# Patient Record
Sex: FEMALE | Race: WHITE | Employment: FULL TIME | URBAN - METROPOLITAN AREA
[De-identification: names, ages, dates, MRNs, and addresses within clinical notes are randomized per-mention and may not be internally consistent; named-entity substitution may affect disease eponyms.]

---

## 2018-09-25 ENCOUNTER — HOSPITAL ENCOUNTER (EMERGENCY)
Age: 23
Discharge: HOME | End: 2018-09-25
Attending: EMERGENCY MEDICINE
Payer: SELF-PAY

## 2018-09-25 VITALS
SYSTOLIC BLOOD PRESSURE: 128 MMHG | HEART RATE: 96 BPM | OXYGEN SATURATION: 100 % | HEIGHT: 67 IN | WEIGHT: 170 LBS | TEMPERATURE: 98 F | RESPIRATION RATE: 16 BRPM | BODY MASS INDEX: 26.68 KG/M2 | DIASTOLIC BLOOD PRESSURE: 77 MMHG

## 2018-09-25 DIAGNOSIS — T18.0XXA FOREIGN BODY IN MOUTH, INITIAL ENCOUNTER: ICD-10-CM

## 2018-09-25 PROCEDURE — 74011250637 HC RX REV CODE- 250/637: Performed by: PHYSICIAN ASSISTANT

## 2018-09-25 PROCEDURE — 99283 EMERGENCY DEPT VISIT LOW MDM: CPT

## 2018-09-25 PROCEDURE — 74011250636 HC RX REV CODE- 250/636: Performed by: PHYSICIAN ASSISTANT

## 2018-09-25 RX ORDER — FAMOTIDINE 20 MG/1
20 TABLET, FILM COATED ORAL 2 TIMES DAILY
Qty: 14 TAB | Refills: 0 | Status: SHIPPED | OUTPATIENT
Start: 2018-09-25 | End: 2018-09-25

## 2018-09-25 RX ORDER — EPINEPHRINE 0.3 MG/.3ML
0.3 INJECTION SUBCUTANEOUS
Qty: 1 SYRINGE | Refills: 1 | Status: SHIPPED | OUTPATIENT
Start: 2018-09-25 | End: 2018-09-25

## 2018-09-25 RX ORDER — PREDNISONE 20 MG/1
60 TABLET ORAL DAILY
Qty: 12 TAB | Refills: 0 | Status: SHIPPED | OUTPATIENT
Start: 2018-09-25 | End: 2018-09-25

## 2018-09-25 RX ORDER — PREDNISONE 20 MG/1
60 TABLET ORAL DAILY
Qty: 12 TAB | Refills: 0 | Status: SHIPPED | OUTPATIENT
Start: 2018-09-25 | End: 2018-09-29

## 2018-09-25 RX ORDER — FAMOTIDINE 20 MG/1
40 TABLET, FILM COATED ORAL
Status: COMPLETED | OUTPATIENT
Start: 2018-09-25 | End: 2018-09-25

## 2018-09-25 RX ORDER — FAMOTIDINE 20 MG/1
20 TABLET, FILM COATED ORAL 2 TIMES DAILY
Qty: 14 TAB | Refills: 0 | Status: SHIPPED | OUTPATIENT
Start: 2018-09-25 | End: 2018-10-02

## 2018-09-25 RX ORDER — DEXAMETHASONE 4 MG/1
10 TABLET ORAL ONCE
Status: COMPLETED | OUTPATIENT
Start: 2018-09-25 | End: 2018-09-25

## 2018-09-25 RX ORDER — DIPHENHYDRAMINE HCL 25 MG
50 CAPSULE ORAL
Status: COMPLETED | OUTPATIENT
Start: 2018-09-25 | End: 2018-09-25

## 2018-09-25 RX ADMIN — DIPHENHYDRAMINE HYDROCHLORIDE 50 MG: 25 CAPSULE ORAL at 15:07

## 2018-09-25 RX ADMIN — FAMOTIDINE 40 MG: 20 TABLET ORAL at 15:08

## 2018-09-25 RX ADMIN — DEXAMETHASONE 10 MG: 4 TABLET ORAL at 15:07

## 2018-09-25 NOTE — ED TRIAGE NOTES
States she was stung on the roof of her mouth by a bee that was in her drink. States she thinks the stinger is still in her mouth.

## 2018-09-25 NOTE — ED PROVIDER NOTES
EMERGENCY DEPARTMENT HISTORY AND PHYSICAL EXAM 
 
Date: 9/25/2018 Patient Name: Julio Newsome History of Presenting Illness Chief Complaint Patient presents with  
 Other History Provided By: Patient Chief Complaint: mouth pain Duration: 1 Hours Timing:  Acute Severity: 4 out of 10 Associated Symptoms: anxious Additional History (Context):  
2:42 PM 
Julio Newsome is a 23 y.o. female with PMHx of allergy to bees who presents to the emergency department C/O mouth pain onset 1 hour ago. Associated symptoms include anxious. Reports she was stung on the roof of her mouth by a bee that was in her drink. States she feels the stinger in the roof of her mouth. Pt denies throat swelling, trouble swallowing, and any other Sx or complaints. PCP: None Past History Past Medical History: 
Past Medical History:  
Diagnosis Date  Hypoglycemia  Scoliosis Past Surgical History: No past surgical history on file. Family History: No family history on file. Social History: 
Social History Substance Use Topics  Smoking status: Never Smoker  Smokeless tobacco: Never Used  Alcohol use No  
 
 
Allergies: Allergies Allergen Reactions  Cinnamon Anaphylaxis  Bee Sting [Sting, Bee] Swelling Review of Systems Review of Systems Constitutional: Negative for activity change. HENT: Negative for drooling, facial swelling and trouble swallowing.   
     (+) mouth pain (-) throat swelling Respiratory: Negative for cough, shortness of breath, wheezing and stridor. Gastrointestinal: Negative for nausea and vomiting. Musculoskeletal: Negative for joint swelling. Skin: Negative for rash. Allergic/Immunologic: Positive for food allergies. Hematological: Negative for adenopathy. Psychiatric/Behavioral: The patient is nervous/anxious. All other systems reviewed and are negative. Physical Exam  
 
Vitals:  
 09/25/18 1430 BP: 128/77 Pulse: 96  
Resp: 16 Temp: 98 °F (36.7 °C) SpO2: 100% Weight: 77.1 kg (170 lb) Height: 5' 7\" (1.702 m) Physical Exam  
Constitutional: She is oriented to person, place, and time. She appears well-developed and well-nourished. No distress.  female in NAD. Alert. Appears comfortable. Handling secretions. In fast track room. HENT:  
Head: Normocephalic and atraumatic. Right Ear: External ear normal. No swelling or tenderness. Tympanic membrane is not perforated, not erythematous and not bulging. Left Ear: External ear normal. No swelling or tenderness. Tympanic membrane is not perforated, not erythematous and not bulging. Nose: Nose normal. No mucosal edema or rhinorrhea. Right sinus exhibits no maxillary sinus tenderness and no frontal sinus tenderness. Left sinus exhibits no maxillary sinus tenderness and no frontal sinus tenderness. Mouth/Throat: Uvula is midline, oropharynx is clear and moist and mucous membranes are normal. No oral lesions. No trismus in the jaw. No dental abscesses or uvula swelling. No oropharyngeal exudate, posterior oropharyngeal edema, posterior oropharyngeal erythema or tonsillar abscesses. Eyes: Conjunctivae are normal. Right eye exhibits no discharge. Left eye exhibits no discharge. No scleral icterus. Neck: Normal range of motion. Cardiovascular: Normal rate, regular rhythm, normal heart sounds and intact distal pulses. Exam reveals no gallop and no friction rub. No murmur heard. Pulmonary/Chest: Effort normal and breath sounds normal. No accessory muscle usage. No tachypnea. No respiratory distress. She has no decreased breath sounds. She has no wheezes. She has no rhonchi. She has no rales. Musculoskeletal: Normal range of motion. Lymphadenopathy:  
  She has no cervical adenopathy. Neurological: She is alert and oriented to person, place, and time. Skin: Skin is warm and dry. No rash noted. She is not diaphoretic.  No erythema. Psychiatric: She has a normal mood and affect. Judgment normal.  
Nursing note and vitals reviewed. Diagnostic Study Results Labs - No results found for this or any previous visit (from the past 12 hour(s)). Radiologic Studies - No orders to display CT Results  (Last 48 hours) None CXR Results  (Last 48 hours) None Medications given in the ED- Medications diphenhydrAMINE (BENADRYL) capsule 50 mg (50 mg Oral Given 9/25/18 1507) dexamethasone (DECADRON) tablet 10 mg (10 mg Oral Given 9/25/18 1507) famotidine (PEPCID) tablet 40 mg (40 mg Oral Given 9/25/18 1508) Medical Decision Making I am the first provider for this patient. I reviewed the vital signs, available nursing notes, past medical history, past surgical history, family history and social history. Vital Signs-Reviewed the patient's vital signs. Pulse Oximetry Analysis - 100% on RA Records Reviewed: Nursing Notes Provider Notes (Medical Decision Making): insect sting. Removed stinger from hard palate with little difficulty. Benadryl, prednisone, pepcid. Observation. No throat swelling or resp problems. No urticaria. No appreciable swelling. Procedures: 
Foreign Body Removal 
Date/Time: 9/25/2018 4:05 PM 
Performed by: Supa Mendoza Authorized by: Supa Mendoza Consent:  
  Consent obtained:  Verbal 
  Consent given by:  Patient Risks discussed:  Pain Alternatives discussed:  No treatment Location: Location: hard palate of mouth. Tendon involvement:  None Pre-procedure details:  
  Neurovascular status: intact Anesthesia (see MAR for exact dosages): Anesthesia method:  None Procedure type:  
  Procedure complexity:  Simple Procedure details: Localization method:  Visualized Dissection of underlying tissues: no   
  Bloodless field: yes Removal mechanism: Forceps Foreign bodies recovered:  1 Description:  Insect stinger Intact foreign body removal: yes Post-procedure details:  
  Neurovascular status: intact Confirmation:  No additional foreign bodies on visualization Skin closure:  None Dressing:  Open (no dressing) Patient tolerance of procedure: Tolerated well, no immediate complications ED Course:  
2:42 PM Initial assessment performed. The patients presenting problems have been discussed, and they are in agreement with the care plan formulated and outlined with them. I have encouraged them to ask questions as they arise throughout their visit. Diagnosis and Disposition Observed in ED. Feels better. No evidence of systemic reaction. Prednisone. Pepcid. Benadryl OTC prn. Epipen. Reasons to RTED discussed with pt. All questions answered. Pt feels comfortable going home at this time. Pt expressed understanding and she agrees with plan. DISCHARGE NOTE: 
Lorena Mcguire Jo-Ann's  results have been reviewed with her. She has been counseled regarding her diagnosis, treatment, and plan. She verbally conveys understanding and agreement of the signs, symptoms, diagnosis, treatment and prognosis and additionally agrees to follow up as discussed. She also agrees with the care-plan and conveys that all of her questions have been answered. I have also provided discharge instructions for her that include: educational information regarding their diagnosis and treatment, and list of reasons why they would want to return to the ED prior to their follow-up appointment, should her condition change. She has been provided with education for proper emergency department utilization. CLINICAL IMPRESSION: 
 
1. Hymenoptera sting, undetermined intent, initial encounter 2. Foreign body in mouth, initial encounter PLAN: 
1. D/C Home 2. Discharge Medication List as of 9/25/2018  4:08 PM  
  
START taking these medications Details EPINEPHrine (EPIPEN) 0.3 mg/0.3 mL injection 0.3 mL by IntraMUSCular route once as needed for up to 1 dose. Indications: Anaphylaxis, Print, Disp-1 Syringe, R-1  
  
famotidine (PEPCID) 20 mg tablet Take 1 Tab by mouth two (2) times a day for 7 days. , Normal, Disp-14 Tab, R-0  
  
predniSONE (DELTASONE) 20 mg tablet Take 3 Tabs by mouth daily for 4 days. Take with food., Normal, Disp-12 Tab, R-0  
  
  
 
3. Follow-up Information Follow up With Details Comments Contact Info [facility]   [address], [address] [address] [phone] [facility] EMERGENCY DEPT  As needed, If symptoms worsen [address] Brig[address] Clancy 
[address] [address] [phone]  
  
 
_______________________________ Attestations: This note is prepared by Soni Martinez, acting as Scribe for [employer]. [employer], LUTHER:  The scribe's documentation has been prepared under my direction and personally reviewed by me in its entirety. I confirm that the note above accurately reflects all work, treatment, procedures, and medical decision making performed by me. 
_______________________________

## 2018-09-25 NOTE — LETTER
[facility] [place] 
[facility] EMERGENCY DEPT 
[address]  [address] 
[phone] Work/School Note Date: 9/25/2018 To Whom It May concern: 
 
Rosa Olivares was seen and treated today in the emergency room by the following provider(s): 
Attending Provider: Yolanda Coffman MD 
Physician Assistant: Bobby Beltran PA-C. Rosa Olivares may return to work on 9/26/18. Sincerely, Cassie Lara PA-C

## 2018-09-25 NOTE — ED NOTES
I have reviewed discharge instructions with the patient. The patient verbalized understanding. Boyfriend driving her home. Patient armband removed and shredded